# Patient Record
Sex: FEMALE | Race: WHITE | NOT HISPANIC OR LATINO | Employment: UNEMPLOYED | URBAN - METROPOLITAN AREA
[De-identification: names, ages, dates, MRNs, and addresses within clinical notes are randomized per-mention and may not be internally consistent; named-entity substitution may affect disease eponyms.]

---
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
## 2019-02-25 ENCOUNTER — AMBULATORY - RIVER FALLS (OUTPATIENT)
Dept: FAMILY MEDICINE | Facility: CLINIC | Age: 19
End: 2019-02-25

## 2021-05-25 ENCOUNTER — RECORDS - HEALTHEAST (OUTPATIENT)
Dept: ADMINISTRATIVE | Facility: CLINIC | Age: 21
End: 2021-05-25

## 2021-05-26 ENCOUNTER — RECORDS - HEALTHEAST (OUTPATIENT)
Dept: ADMINISTRATIVE | Facility: CLINIC | Age: 21
End: 2021-05-26

## 2021-10-12 DIAGNOSIS — Q21.11 OSTIUM SECUNDUM TYPE ATRIAL SEPTAL DEFECT: Primary | ICD-10-CM

## 2021-11-19 ENCOUNTER — OFFICE VISIT (OUTPATIENT)
Dept: PEDIATRIC CARDIOLOGY | Facility: CLINIC | Age: 21
End: 2021-11-19
Attending: PEDIATRICS
Payer: MEDICAID

## 2021-11-19 ENCOUNTER — HOSPITAL ENCOUNTER (OUTPATIENT)
Dept: CARDIOLOGY | Facility: CLINIC | Age: 21
End: 2021-11-19
Attending: PEDIATRICS
Payer: MEDICAID

## 2021-11-19 VITALS
SYSTOLIC BLOOD PRESSURE: 98 MMHG | DIASTOLIC BLOOD PRESSURE: 61 MMHG | WEIGHT: 151.01 LBS | BODY MASS INDEX: 23.7 KG/M2 | RESPIRATION RATE: 16 BRPM | HEIGHT: 67 IN | HEART RATE: 71 BPM

## 2021-11-19 DIAGNOSIS — Q21.11 OSTIUM SECUNDUM TYPE ATRIAL SEPTAL DEFECT: Primary | ICD-10-CM

## 2021-11-19 DIAGNOSIS — Q21.11 OSTIUM SECUNDUM TYPE ATRIAL SEPTAL DEFECT: ICD-10-CM

## 2021-11-19 PROCEDURE — 99203 OFFICE O/P NEW LOW 30 MIN: CPT | Mod: 25 | Performed by: PEDIATRICS

## 2021-11-19 PROCEDURE — 93320 DOPPLER ECHO COMPLETE: CPT

## 2021-11-19 PROCEDURE — 93325 DOPPLER ECHO COLOR FLOW MAPG: CPT

## 2021-11-19 PROCEDURE — G0463 HOSPITAL OUTPT CLINIC VISIT: HCPCS | Mod: 25

## 2021-11-19 PROCEDURE — 93303 ECHO TRANSTHORACIC: CPT | Mod: 26 | Performed by: PEDIATRICS

## 2021-11-19 PROCEDURE — 93325 DOPPLER ECHO COLOR FLOW MAPG: CPT | Mod: 26 | Performed by: PEDIATRICS

## 2021-11-19 PROCEDURE — 93320 DOPPLER ECHO COMPLETE: CPT | Mod: 26 | Performed by: PEDIATRICS

## 2021-11-19 ASSESSMENT — MIFFLIN-ST. JEOR: SCORE: 1477.75

## 2021-11-19 NOTE — PROGRESS NOTES
"                                              PEDS Cardiac Letter  Date: 2021      Renan Shah MD  [address] [place] [address]      PATIENT: Carolin Baptiste  :         2000   HARMAN:         2021    Dear Dr Archer:    Carolin is 21 years old and was seen at the [facility] on 2021. She is seen for follow-up of an atrial communication.  I last saw her 6 years ago when she had a 5 to 6 mm opening in her atrial septum.  Since that time she has done well.  She has not experienced any chest pain, syncope or palpitations.  She is in her last year of schooling at [employer] where she is taking dietetics.  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  She was only 1 in her family who had no symptoms of Covid infection last November.  Her mother had surgical correction of an atrial septal defect at 21 years of age, and 3 of her siblings have had device closure of atrial septal defects.    On physical examination her height was 1.702 m (5' 7.01\") and her weight was 68.5 kg (151 lb 0.2 oz). Her heart rate was 71 and respirations 16 per minute. The blood pressure in her right arm was 98/61. She was acyanotic, warm and well perfused. She was alert, cooperative, and in no distress. Her lungs were clear to auscultation without respiratory distress. She had a regular rhythm with no murmur. The second heart sound was physiologically split with a normal pulmonary component. There was no organomegaly or abdominal tenderness. Peripheral pulses were 2+ and equal in all extremities. There was no clubbing or edema.    An echocardiogram performed today that I personally reviewed and explained to her and her mother showed a patent foramen ovale with a left-to-right shunt.  There was no right-sided cardiac enlargement.    Carolin has a patent foramen ovale, normal finding in people even at her age.  She does not need any restriction of her activities.  I do not " expect for this to be a problem for her in the future, and I do not think it is necessary for her to continue to have cardiology follow-up.  There is no family history of thrombophilia.  She does have a 50% chance of having a child with congenital heart disease, and should she could become pregnant I would recommend a fetal echocardiogram at 18 weeks gestation.    Thank you very much for your confidence in allowing me to participate in Carolin's care. If you have any questions or concerns, please don't hesitate to contact me.    Sincerely,      Renan Shah M.D.   Pediatric Cardiology   [facility]  Pediatric Specialty Clinic  [phone]    Note: Chart documentation done in part with Dragon Voice Recognition software. Although reviewed after completion, some word and grammatical errors may remain.

## 2021-11-19 NOTE — NURSING NOTE
"Chief Complaint   Patient presents with     RECHECK     ASD       BP 98/61 (BP Location: Right arm, Patient Position: Sitting, Cuff Size: Adult Regular)   Pulse 71   Resp 16   Ht 5' 7.01\" (170.2 cm)   Wt 151 lb 0.2 oz (68.5 kg)   BMI 23.65 kg/m      Savanna Salter, EMT  November 19, 2021  "

## 2021-11-19 NOTE — LETTER
"  2021      RE: Carolin Baptiste  [address]  [facility] [address]                                                     PEDS Cardiac Letter  Date: 2021      Renan Shah MD  [address] [facility] [place] [address]      PATIENT: Carolin Baptiste  :         2000   HARMAN:         2021    Dear Dr Archer:    Carolin is 21 years old and was seen at the [facility] on 2021. She is seen for follow-up of an atrial communication.  I last saw her 6 years ago when she had a 5 to 6 mm opening in her atrial septum.  Since that time she has done well.  She has not experienced any chest pain, syncope or palpitations.  She is in her last year of schooling at [employer] where she is taking dietetics.  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  She was only 1 in her family who had no symptoms of Covid infection last November.  Her mother had surgical correction of an atrial septal defect at 21 years of age, and 3 of her siblings have had device closure of atrial septal defects.    On physical examination her height was 1.702 m (5' 7.01\") and her weight was 68.5 kg (151 lb 0.2 oz). Her heart rate was 71 and respirations 16 per minute. The blood pressure in her right arm was 98/61. She was acyanotic, warm and well perfused. She was alert, cooperative, and in no distress. Her lungs were clear to auscultation without respiratory distress. She had a regular rhythm with no murmur. The second heart sound was physiologically split with a normal pulmonary component. There was no organomegaly or abdominal tenderness. Peripheral pulses were 2+ and equal in all extremities. There was no clubbing or edema.    An echocardiogram performed today that I personally reviewed and explained to her and her mother showed a patent foramen ovale with a left-to-right shunt.  There was no right-sided cardiac enlargement.    Carolin has a patent foramen ovale, normal finding in people " even at her age.  She does not need any restriction of her activities.  I do not expect for this to be a problem for her in the future, and I do not think it is necessary for her to continue to have cardiology follow-up.  There is no family history of thrombophilia.  She does have a 50% chance of having a child with congenital heart disease, and should she could become pregnant I would recommend a fetal echocardiogram at 18 weeks gestation.    Thank you very much for your confidence in allowing me to participate in Carolin's care. If you have any questions or concerns, please don't hesitate to contact me.    Sincerely,      Renan Shah M.D.   Pediatric Cardiology   [facility]  Pediatric Specialty Clinic  [phone]    Note: Chart documentation done in part with Dragon Voice Recognition software. Although reviewed after completion, some word and grammatical errors may remain.         Renan Shah MD

## 2021-11-19 NOTE — PATIENT INSTRUCTIONS
[facility]  [address]  [address]  [facility] [id number] [phone]      Cardiology Clinic   RN Care Coordinators, Shaylee Vang (Bre) or Venessa Dominguez  [phone]  Pediatric Call Center/Scheduling  [phone]    After Hours and Emergency Contact Number  [phone]  * Ask for the pediatric cardiologist on call         Prescription Renewals  The pharmacy must fax requests to [phone]  * Please allow 3-4 days for prescriptions to be authorized     Your feedback is very important to us. If you receive a survey about your visit today, please take the time to fill this out so we can continue to improve.

## 2022-02-11 VITALS
DIASTOLIC BLOOD PRESSURE: 62 MMHG | OXYGEN SATURATION: 98 % | HEIGHT: 67 IN | SYSTOLIC BLOOD PRESSURE: 100 MMHG | HEART RATE: 78 BPM | BODY MASS INDEX: 22.7 KG/M2 | WEIGHT: 144.6 LBS

## 2022-02-15 NOTE — TELEPHONE ENCOUNTER
---------------------  From: Debbie Blanca LPN (Phone Messages Pool ([id number]_[facility]))   Sent: 12/4/2020 1:51:05 PM CST  Subject: referral- orthotics     Phone Message    PCP:   ELY      Time of Call:  1:03pm        Person Calling:  lala Dinorah  Phone number:  [phone] OK to FALGUNI     Returned call at: 1:47pm    Note:   Mom FALGUNI stating pt needs new orthotics- she is asking if ELY will send a referral to [facility] in [place].  Returned call and informed Mom pt has not been seen in over 2 years- will need appt.    Last office visit and reason:  8/14/18 ankle/foot problems

## 2022-02-15 NOTE — PROGRESS NOTES
Patient:   ADELA HUYNH            MRN: [id number]            FIN: [id number]               Age:   18 years     Sex:  Female     :  2000   Associated Diagnoses:   Pes planus   Author:   Elian Roman MD      Impression and Plan   Diagnosis     Pes planus (NOQ10-NN M21.40).     Course:  Worsening.    Plan:  prescription orthotics at [facility] in [place].    Orders     Orders   Charges (Evaluation and Management):  50545 office outpatient visit 15 minutes (Charge) (Order): Quantity: 1, Pes planus.        Visit Information   Visit type:  Scheduled follow-up.    Accompanied by:  Mother.    Source of history:  Self, Mother.    Referral source:  Self.    History limitation:  None.       Chief Complaint   2018 1:24 PM CDT    Pt here for an order for orthotics        History of Present Illness             The patient presents with congenital Pes Planus.  Exacerbating factors consist of movement and walking.  Relieving factors consist of orthotics.  Associated symptoms consist of none.        Review of Systems   Constitutional:  Negative.    Eye:  Negative.    Ear/Nose/Mouth/Throat:  Negative.    Respiratory:  Negative.    Cardiovascular:  Negative.    Gastrointestinal:  Negative.    Genitourinary:  Negative.    Hematology/Lymphatics:  Negative.    Endocrine:  Negative.    Immunologic:  Negative.    Musculoskeletal:  Negative.    Integumentary:  Negative.    Neurologic:  Negative.    Psychiatric:  Negative.    All other systems reviewed and negative      Health Status   Allergies:    Allergic Reactions (Selected)  No known allergies   Problem list:    All Problems (Selected)  ASD - Atrial septal defect / SNOMED CT 521760839 / Confirmed      Histories   Past Medical History:    Active  ASD - Atrial septal defect (196202676)  Resolved  Fractured tibia and fibula (823.82): Onset in  at 2 years.  Resolved.  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  Comments:  6/14/2011 CDT 2:50 PM CDT - Sebas MONTANO, Venessa  Hospitalized overnight, required spica cast.  Followed by ortho until age 4.   Procedure history:    No previous procedures.   Social History:        Home and Environment Assessment            Lives with Father, Mother, Siblings.  Smoker in household: No.  Risks in environment: Firearms in the home..        Physical Examination   Vital Signs   8/14/2018 1:24 PM CDT Peripheral Pulse Rate 78 bpm    Systolic Blood Pressure 100 mmHg    Diastolic Blood Pressure 62 mmHg    Mean Arterial Pressure 75 mmHg    Oxygen Saturation 98 %      Measurements from flowsheet : Measurements   8/14/2018 1:24 PM CDT Height Measured - Standard 67.25 in    Weight Measured - Standard 144.6 lb    BSA 1.76 m2    Body Mass Index 22.48 kg/m2    Body Mass Index Percentile 60.02      General:  Alert and oriented X 3, No acute distress, Warm, Pink, Intact.         Appearance: Within normal limits, Well nourished, Calm.         Hydration: Within normal limits.         Psych: Within normal limits, Appropriate mood and affect, Cooperative, Normal judgment.    Musculoskeletal:  bilateral Pes Planus.       Health Maintenance      Recommendations     Pending (in the next year)        Due            Alcohol Misuse Screen (Female) due  08/14/18  and every 1  year(s)           Chlamydia Screen (if sexually active) due  08/14/18  and every 1  year(s)           Depression Screen (Female) due  08/14/18  and every 1  year(s)           Gonorrhea Screen (if sexually active) due  08/14/18  and every 1  year(s)           HIV Screen (if sexually active) (Female) due  08/14/18  and every 1  year(s)           STD Counseling (if sexually active) (Female) due  08/14/18  and every 1  year(s)           Syphilis Screen (if sexually active) (Female) due  08/14/18  and every 1  year(s)     Satisfied (in the past 1 year)        Satisfied            Body Mass Index Check (Female) on  08/14/18.           High  Blood Pressure Screen (Female) on  08/14/18.           Tobacco Use Screen (Female) on  08/14/18.

## 2024-03-28 ENCOUNTER — TELEPHONE (OUTPATIENT)
Dept: PEDIATRIC CARDIOLOGY | Facility: CLINIC | Age: 24
End: 2024-03-28
Payer: MEDICAID

## 2024-03-28 NOTE — TELEPHONE ENCOUNTER
[facility] Call Center    Phone Message    May a detailed message be left on voicemail: yes     Reason for Call: Other: Patient is wanting a call back from someone to discuss concerns about her case as she is now pregnant. Please call patient back. Thanks.     Action Taken: Other: Peds Cardiology    Travel Screening: Not Applicable

## 2024-04-12 ENCOUNTER — TELEPHONE (OUTPATIENT)
Dept: PEDIATRIC CARDIOLOGY | Facility: CLINIC | Age: 24
End: 2024-04-12
Payer: MEDICAID

## 2024-04-12 NOTE — TELEPHONE ENCOUNTER
Called back, will attempt to resend records via email.     Shy Almendarez RN on 4/12/2024 at 2:11 PM
